# Patient Record
Sex: FEMALE | Race: WHITE | URBAN - METROPOLITAN AREA
[De-identification: names, ages, dates, MRNs, and addresses within clinical notes are randomized per-mention and may not be internally consistent; named-entity substitution may affect disease eponyms.]

---

## 2017-02-21 ENCOUNTER — OFFICE VISIT (OUTPATIENT)
Dept: FAMILY MEDICINE CLINIC | Facility: CLINIC | Age: 22
End: 2017-02-21

## 2017-02-21 VITALS
HEART RATE: 114 BPM | SYSTOLIC BLOOD PRESSURE: 170 MMHG | BODY MASS INDEX: 39.09 KG/M2 | HEIGHT: 64 IN | WEIGHT: 229 LBS | OXYGEN SATURATION: 97 % | DIASTOLIC BLOOD PRESSURE: 100 MMHG

## 2017-02-21 DIAGNOSIS — R03.0 ELEVATED BLOOD PRESSURE READING WITHOUT DIAGNOSIS OF HYPERTENSION: ICD-10-CM

## 2017-02-21 DIAGNOSIS — J06.9 VIRAL UPPER RESPIRATORY TRACT INFECTION: Primary | ICD-10-CM

## 2017-02-21 PROCEDURE — 99202 OFFICE O/P NEW SF 15 MIN: CPT

## 2017-02-21 RX ORDER — PROMETHAZINE HYDROCHLORIDE AND CODEINE PHOSPHATE 6.25; 1 MG/5ML; MG/5ML
5 SYRUP ORAL EVERY 6 HOURS PRN
Qty: 240 ML | Refills: 0 | Status: SHIPPED | OUTPATIENT
Start: 2017-02-21

## 2017-02-21 NOTE — PROGRESS NOTES
HPI:    Patient ID: Judy Sharp is a 25year old female. Cough  This is a new problem. Episode onset: 6 weeks ago. The problem has been waxing and waning. The problem occurs every few minutes. The cough is productive of sputum.  Associated symptoms sinus tenderness. Left sinus exhibits no maxillary sinus tenderness and no frontal sinus tenderness. Mouth/Throat: Mucous membranes are normal. Posterior oropharyngeal erythema present.    Cardiovascular: Normal rate, regular rhythm and normal heart sound

## (undated) NOTE — MR AVS SNAPSHOT
[address] at [facility]  [address]  [facility], [address] [facility] [address] [address] [address] [place]                Thank you for choosing us for your health care visit with Liya Person MD.  We are glad to serve you and happy to can · You may use acetaminophen or ibuprofen to control pain and fever, unless another medicine was prescribed.  (Note: If you have chronic liver or kidney disease, have ever had a stomach ulcer or gastrointestinal bleeding, or are taking blood-thinning medicin 170/100 mmHg 114 64\" 229 lb 39.29 kg/m2 No         Current Medications          This list is accurate as of: 2/21/17  8:38 PM.  Always use your most recent med list.                promethazine-codeine 6.25-10 MG/5ML Syrp   Take 5 mL by mouth every 6 (si day (2.4 g sodium or 6 g sodium chloride)   Aerobic physical activity Regular aerobic physical activity (e.g., brisk walking, light jogging, cycling, swimming, etc.) for a goal of at least 150 minutes per week.    Moderation of alcohol consumption Men: Den Alvarado